# Patient Record
Sex: MALE | Race: BLACK OR AFRICAN AMERICAN | NOT HISPANIC OR LATINO | URBAN - METROPOLITAN AREA
[De-identification: names, ages, dates, MRNs, and addresses within clinical notes are randomized per-mention and may not be internally consistent; named-entity substitution may affect disease eponyms.]

---

## 2018-04-06 ENCOUNTER — EMERGENCY (EMERGENCY)
Facility: HOSPITAL | Age: 43
LOS: 1 days | Discharge: HOME | End: 2018-04-06
Attending: EMERGENCY MEDICINE | Admitting: EMERGENCY MEDICINE
Payer: COMMERCIAL

## 2018-04-06 VITALS
OXYGEN SATURATION: 100 % | DIASTOLIC BLOOD PRESSURE: 71 MMHG | SYSTOLIC BLOOD PRESSURE: 143 MMHG | TEMPERATURE: 98 F | RESPIRATION RATE: 16 BRPM | HEART RATE: 85 BPM

## 2018-04-06 PROCEDURE — 99282 EMERGENCY DEPT VISIT SF MDM: CPT

## 2018-04-06 RX ORDER — ACETAMINOPHEN 500 MG
650 TABLET ORAL ONCE
Qty: 0 | Refills: 0 | Status: COMPLETED | OUTPATIENT
Start: 2018-04-06 | End: 2018-04-06

## 2018-04-06 RX ADMIN — Medication 650 MILLIGRAM(S): at 09:15

## 2018-04-06 NOTE — ED PROVIDER NOTE - PHYSICAL EXAMINATION
pt sitting up nontoxic appearing, mmm. no drooling. talking in full clear sentences. no swelling or erythema of the left side of face. no fluctuance. tender along tooth #17, no trauma to tooth, no swelling or erythema of gum.

## 2018-04-06 NOTE — ED PROVIDER NOTE - OBJECTIVE STATEMENT
42yo M no sig pmhx pw "I want my tooth pulled"   Pain for the last 3-4 days, constant dull pain located  at the lower left back tooth. Called his dentist two days ago, gave him amoxicillin over the phone and an appointment for next week.  Took two days of amoxicillin. Wife state "he didn't want to wait" came today for "tooth extraction"   no fever or chills. no difficulty speaking or swallowing. no change in voice. Had similar symptoms in Nov 2017